# Patient Record
Sex: FEMALE | Race: WHITE | NOT HISPANIC OR LATINO | Employment: OTHER | URBAN - METROPOLITAN AREA
[De-identification: names, ages, dates, MRNs, and addresses within clinical notes are randomized per-mention and may not be internally consistent; named-entity substitution may affect disease eponyms.]

---

## 2017-06-22 ENCOUNTER — TELEPHONE (OUTPATIENT)
Dept: FAMILY MEDICINE | Facility: CLINIC | Age: 82
End: 2017-06-22

## 2017-06-22 NOTE — TELEPHONE ENCOUNTER
Daughter called in regards to results of CXR done on 06/17/2017 at [place].  Patient still c/o pain on right side.Please call Mrs. Lama  X-ray attached

## 2017-06-23 ENCOUNTER — TELEPHONE (OUTPATIENT)
Dept: FAMILY MEDICINE | Facility: CLINIC | Age: 82
End: 2017-06-23

## 2017-06-23 NOTE — TELEPHONE ENCOUNTER
----- Message from Amber Parker sent at 6/23/2017  9:26 AM CDT -----  Contact: Daughter:  Shannon Lama [phone]  Returned your call.

## 2017-06-26 ENCOUNTER — TELEPHONE (OUTPATIENT)
Dept: FAMILY MEDICINE | Facility: CLINIC | Age: 82
End: 2017-06-26

## 2017-06-26 NOTE — TELEPHONE ENCOUNTER
----- Message from Malena Landry MA sent at 6/26/2017  8:08 AM CDT -----  Please review Shoulder X-ray report

## 2017-06-26 NOTE — TELEPHONE ENCOUNTER
----- Message from Malena Landry MA sent at 6/26/2017  8:08 AM CDT -----  Please review Shoulder X-ray report    A chair report reveals no fracture dislocation or subluxation. please inform patient.

## 2018-02-12 LAB
BUN SERPL-MCNC: 53 MG/DL (ref 8–20)
CALCIUM SERPL-MCNC: 7.2 MG/DL (ref 7.7–10.4)
CHLORIDE: 120 MMOL/L (ref 98–110)
CO2 SERPL-SCNC: 20.2 MMOL/L (ref 22.8–31.6)
CREATININE: 1.73 MG/DL (ref 0.6–1.4)
GLUCOSE: 159 MG/DL (ref 70–99)
POTASSIUM SERPL-SCNC: 3.3 MMOL/L (ref 3.5–5)
SODIUM: 148 MMOL/L (ref 134–144)